# Patient Record
(demographics unavailable — no encounter records)

---

## 2024-11-13 NOTE — HISTORY OF PRESENT ILLNESS
[FreeTextEntry1] : skin growth [de-identified] : 57 yo F  hx of rheumatoid arthritis occasionally gets painful nodules on feet started on humira since july - sx well controlled since last thursday developed rash on right medial ankle - was more swollen but still sensitive used triamcinolone  no new meds no other rashes no preceding viral sx no hx of venous stasis no new outdoor or contact exposures

## 2024-11-13 NOTE — ASSESSMENT
[FreeTextEntry1] : Dermatitis, right medial ankle New diagnosis,  uncertain clinical course Differential includes related to RA vs unrelated skin process (e.g. arthropod bite reaction) Given solitary lesion for short onset, trial of topical and systemic steroids first if not resolving/progressing, will likely biopsy clobetasol ointment 1-2x/day prednisone 60 mg daily x 5 days, 40 mg daily x 5 days, 20 mg daily x 5 days. reviewed short term side effects of systemic steroids. (can cut taper from every 5 to 3 days if improving) reassess in 2 weeks